# Patient Record
(demographics unavailable — no encounter records)

---

## 2025-05-04 NOTE — REASON FOR VISIT
[Follow-Up Visit] : a follow-up visit for [Rhabdomyosarcoma] : rhabdomyosarcoma [Patient] : patient [Mother] : mother [Medical Records] : medical records

## 2025-05-06 NOTE — HISTORY OF PRESENT ILLNESS
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   She was initially diagnosed with an alveolar ARUN of the right forearm in December 2016 at age 13.She also has Type I diabetes. In October 2016 Mahogany reported trauma to her arm during a martial arts class and she felt a lump on the palmar surface of her right forearm. She was seen an in urgent care and an ultrasound was suspicious for a mass. An MRI in November 2016 demonstrated a large soft tissue mass that was 3.6 X 2.4 X 7.9 cm multilobulated area of signal abnormality within the flexor compartment of the forearm with peripheral areas of internal enhancement which distorts the flexor digitorum profundus and superficialis musculature with soft tissue edema. 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 A chest CT was done immediately post operatively on December 1 which had no evidence of parenchymal lung lesions but revealed an enlarged right axillary node (2.5 cm) and right retropectoral lymph node (1.5 cm). A PET/CT was done on December 5th which had an SUV of 3.3 in the mass and the lymph node only had an SUV 2.1. There were no other nodes seen or evidence of bone involvement. The axillary node was positive by IR biopsy. 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 Completed initial chemotherapy with VAc/VI as per [id number] in August 2017 Due to risk of recurrence with Alveolar ARUN she was offered maintenance therapy extensive discussion of risk and benefits were discussed  family understands that there is no proven benefit at this time and there may be risks associated  the plan was to start on pazopanib for oral maintenance chemotherapy but she developed a non-healing ulcer on her heal that required significant wound care so the decision was made to switch to a modified chemotherapy regimen similar to [id number] but using oral cyclophosphamide, vinorelbine and oral sirolimus. She did not tolerate the oral sirolimus due to severe mucositis so was switched to avastin. IV therapy completed 8/26/19, po cyclophosphamide completed 8-31-19   Mahogany completed therapy AUGUST 2019 while in follow up an MRI in November 2020 with 0.4 cm nodule next to the incision primary site the lesion did not light up on pet scan however only 0.4 cm so was likely below level of detection She had the lesion which had doubled in size removed on December 24 and was diagnostic for relapsed ARUN Repeat extent of disease in January demonstrated no metastatic disease [place] node biopsy by Dr Pond was negative for metastatic disease PORT PLACED 1-22-21 LIPOSOMAL DOXO started on 1-22-21 3/5/21- ED visit for IV pain medicine and IV hydration due to mucositis and pain and palmar plantar erythrodysaesthesia. dose 3 3/22 and dose 4 4/22/21 dose reduced 25%-tolerated well Radiation 5/10-6/14/21-no doxil during RT. plan to resume dose 5 doxil 6/21/21. dose 6 of doxil back up to 100% dosing on 7-19-21 dose 7 of doxil on 8-16-21 dose 8 doxil on 9-16-21 dose 9 doxil on 10/11/21 dose 10 doxil on 11/22/21 (delayed due to poor wound healing) dose 11 doxil on 12-20-21.   Completed vaccine trial in [place] 7/7/22. [de-identified] : Relapsed ARUN January 2021 s/p 11 cycle liposomal doxo completed January 2022. 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

## 2025-05-06 NOTE — CONSULT LETTER
[Dear  ___] : Dear  [unfilled], [Courtesy Letter:] : I had the pleasure of seeing your patient, [unfilled], in my office today. [Please see my note below.] : Please see my note below. [Consult Closing:] : Thank you very much for allowing me to participate in the care of this patient.  If you have any questions, please do not hesitate to contact me. [Sincerely,] : Sincerely, [FreeTextEntry2] : Aisha Selby M.D.\par  [address] [facility]\[place]  [address]\par  Tel. #: [phone]\par  Fax #: [phone] [FreeTextEntry3] : Joelle Mcclellan MD \par  Head, Pediatric Oncology Solid Tumor Program\par  [facility] \danial   of Pediatrics\danial  [facility] at [facility]\danial [email or web address]  \par  \par

## 2025-05-06 NOTE — PHYSICAL EXAM
[Thin] : thin [Mediport] : Mediport [Gait normal] : gait normal [Normal] : affect appropriate [100: Normal, no complaints, no evidence of disease.] : 100: Normal, no complaints, no evidence of disease. [de-identified] : left turbinate larger than right [Mucositis] : no mucositis [Thrush] : no thrush [de-identified] : well appearing [de-identified] : clear bilaterally [de-identified] : s/p tendon release with increasedmotion no nodularity in the original scar [de-identified] : right forearm with subcutaneous erythema under the skin at RT site. No palpable mass. improved range of motion of fingers

## 2025-05-06 NOTE — CONSULT LETTER
[Dear  ___] : Dear  [unfilled], [Courtesy Letter:] : I had the pleasure of seeing your patient, [unfilled], in my office today. [Please see my note below.] : Please see my note below. [Consult Closing:] : Thank you very much for allowing me to participate in the care of this patient.  If you have any questions, please do not hesitate to contact me. [Sincerely,] : Sincerely, [FreeTextEntry2] : Aisha Selby M.D.\par  [address] [facility]\[place]  [address]\par  Tel. #: [phone]\par  Fax #: [phone] [FreeTextEntry3] : Joelle Mcclellan MD \par  Head, Pediatric Oncology Solid Tumor Program\par  [facility] \danial   of Pediatrics\danial  [facility] at [facility]\danial [email or web address]  \par  \par

## 2025-05-06 NOTE — REVIEW OF SYSTEMS
[Paresthesia] : tingling [Neuropathy] : neuropathy [Negative] : Allergic/Immunologic [Immunizations are up to date by report] : Immunizations are up to date by report [Mouth Ulcers] : no mouth ulcers [Palpitations] : no palpitations [FreeTextEntry8] : gluten intolerant following GI  [de-identified] : right hand with limited extension, [de-identified] : Type 1 diabetes [de-identified] : .

## 2025-05-06 NOTE — HISTORY OF PRESENT ILLNESS
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   She was initially diagnosed with an alveolar ARUN of the right forearm in December 2016 at age 13.She also has Type I diabetes. In October 2016 Mahogany reported trauma to her arm during a martial arts class and she felt a lump on the palmar surface of her right forearm. She was seen an in urgent care and an ultrasound was suspicious for a mass. An MRI in November 2016 demonstrated a large soft tissue mass that was 3.6 X 2.4 X 7.9 cm multilobulated area of signal abnormality within the flexor compartment of the forearm with peripheral areas of internal enhancement which distorts the flexor digitorum profundus and superficialis musculature with soft tissue edema. 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 A chest CT was done immediately post operatively on December 1 which had no evidence of parenchymal lung lesions but revealed an enlarged right axillary node (2.5 cm) and right retropectoral lymph node (1.5 cm). A PET/CT was done on December 5th which had an SUV of 3.3 in the mass and the lymph node only had an SUV 2.1. There were no other nodes seen or evidence of bone involvement. The axillary node was positive by IR biopsy. 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 Completed initial chemotherapy with VAc/VI as per [id number] in August 2017 Due to risk of recurrence with Alveolar ARUN she was offered maintenance therapy extensive discussion of risk and benefits were discussed  family understands that there is no proven benefit at this time and there may be risks associated  the plan was to start on pazopanib for oral maintenance chemotherapy but she developed a non-healing ulcer on her heal that required significant wound care so the decision was made to switch to a modified chemotherapy regimen similar to [id number] but using oral cyclophosphamide, vinorelbine and oral sirolimus. She did not tolerate the oral sirolimus due to severe mucositis so was switched to avastin. IV therapy completed 8/26/19, po cyclophosphamide completed 8-31-19   Mahogany completed therapy AUGUST 2019 while in follow up an MRI in November 2020 with 0.4 cm nodule next to the incision primary site the lesion did not light up on pet scan however only 0.4 cm so was likely below level of detection She had the lesion which had doubled in size removed on December 24 and was diagnostic for relapsed ARUN Repeat extent of disease in January demonstrated no metastatic disease [place] node biopsy by Dr Pond was negative for metastatic disease PORT PLACED 1-22-21 LIPOSOMAL DOXO started on 1-22-21 3/5/21- ED visit for IV pain medicine and IV hydration due to mucositis and pain and palmar plantar erythrodysaesthesia. dose 3 3/22 and dose 4 4/22/21 dose reduced 25%-tolerated well Radiation 5/10-6/14/21-no doxil during RT. plan to resume dose 5 doxil 6/21/21. dose 6 of doxil back up to 100% dosing on 7-19-21 dose 7 of doxil on 8-16-21 dose 8 doxil on 9-16-21 dose 9 doxil on 10/11/21 dose 10 doxil on 11/22/21 (delayed due to poor wound healing) dose 11 doxil on 12-20-21.   Completed vaccine trial in [place] 7/7/22. [de-identified] : Relapsed ARUN January 2021 s/p 11 cycle liposomal doxo completed January 2022. 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

## 2025-05-06 NOTE — PHYSICAL EXAM
[Thin] : thin [Mediport] : Mediport [Gait normal] : gait normal [Normal] : affect appropriate [100: Normal, no complaints, no evidence of disease.] : 100: Normal, no complaints, no evidence of disease. [de-identified] : left turbinate larger than right [Mucositis] : no mucositis [Thrush] : no thrush [de-identified] : well appearing [de-identified] : clear bilaterally [de-identified] : s/p tendon release with increasedmotion no nodularity in the original scar [de-identified] : right forearm with subcutaneous erythema under the skin at RT site. No palpable mass. improved range of motion of fingers

## 2025-05-06 NOTE — REVIEW OF SYSTEMS
[Paresthesia] : tingling [Neuropathy] : neuropathy [Negative] : Allergic/Immunologic [Immunizations are up to date by report] : Immunizations are up to date by report [Mouth Ulcers] : no mouth ulcers [Palpitations] : no palpitations [FreeTextEntry8] : gluten intolerant following GI  [de-identified] : right hand with limited extension, [de-identified] : Type 1 diabetes [de-identified] : .

## 2025-05-06 NOTE — REVIEW OF SYSTEMS
[Paresthesia] : tingling [Neuropathy] : neuropathy [Negative] : Allergic/Immunologic [Immunizations are up to date by report] : Immunizations are up to date by report [Mouth Ulcers] : no mouth ulcers [Palpitations] : no palpitations [FreeTextEntry8] : gluten intolerant following GI  [de-identified] : right hand with limited extension, [de-identified] : Type 1 diabetes [de-identified] : .

## 2025-05-06 NOTE — CONSULT LETTER
[Dear  ___] : Dear  [unfilled], [Courtesy Letter:] : I had the pleasure of seeing your patient, [unfilled], in my office today. [Please see my note below.] : Please see my note below. [Consult Closing:] : Thank you very much for allowing me to participate in the care of this patient.  If you have any questions, please do not hesitate to contact me. [Sincerely,] : Sincerely, [FreeTextEntry2] : Aisha Selby M.D.\par  [address] [facility]\[place]  [address]\par  Tel. #: [phone]\par  Fax #: [phone] [FreeTextEntry3] : Joelle Mcclellan MD \par  Head, Pediatric Oncology Solid Tumor Program\par  [facility] \danial   of Pediatrics\danial  [facility] at [facility]\danial [email or web address]  \par  \par

## 2025-05-06 NOTE — HISTORY OF PRESENT ILLNESS
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   She was initially diagnosed with an alveolar ARUN of the right forearm in December 2016 at age 13.She also has Type I diabetes. In October 2016 Mahogany reported trauma to her arm during a martial arts class and she felt a lump on the palmar surface of her right forearm. She was seen an in urgent care and an ultrasound was suspicious for a mass. An MRI in November 2016 demonstrated a large soft tissue mass that was 3.6 X 2.4 X 7.9 cm multilobulated area of signal abnormality within the flexor compartment of the forearm with peripheral areas of internal enhancement which distorts the flexor digitorum profundus and superficialis musculature with soft tissue edema. 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 A chest CT was done immediately post operatively on December 1 which had no evidence of parenchymal lung lesions but revealed an enlarged right axillary node (2.5 cm) and right retropectoral lymph node (1.5 cm). A PET/CT was done on December 5th which had an SUV of 3.3 in the mass and the lymph node only had an SUV 2.1. There were no other nodes seen or evidence of bone involvement. The axillary node was positive by IR biopsy. 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 Completed initial chemotherapy with VAc/VI as per [id number] in August 2017 Due to risk of recurrence with Alveolar ARUN she was offered maintenance therapy extensive discussion of risk and benefits were discussed  family understands that there is no proven benefit at this time and there may be risks associated  the plan was to start on pazopanib for oral maintenance chemotherapy but she developed a non-healing ulcer on her heal that required significant wound care so the decision was made to switch to a modified chemotherapy regimen similar to [id number] but using oral cyclophosphamide, vinorelbine and oral sirolimus. She did not tolerate the oral sirolimus due to severe mucositis so was switched to avastin. IV therapy completed 8/26/19, po cyclophosphamide completed 8-31-19   Mahogany completed therapy AUGUST 2019 while in follow up an MRI in November 2020 with 0.4 cm nodule next to the incision primary site the lesion did not light up on pet scan however only 0.4 cm so was likely below level of detection She had the lesion which had doubled in size removed on December 24 and was diagnostic for relapsed ARUN Repeat extent of disease in January demonstrated no metastatic disease [place] node biopsy by Dr Pond was negative for metastatic disease PORT PLACED 1-22-21 LIPOSOMAL DOXO started on 1-22-21 3/5/21- ED visit for IV pain medicine and IV hydration due to mucositis and pain and palmar plantar erythrodysaesthesia. dose 3 3/22 and dose 4 4/22/21 dose reduced 25%-tolerated well Radiation 5/10-6/14/21-no doxil during RT. plan to resume dose 5 doxil 6/21/21. dose 6 of doxil back up to 100% dosing on 7-19-21 dose 7 of doxil on 8-16-21 dose 8 doxil on 9-16-21 dose 9 doxil on 10/11/21 dose 10 doxil on 11/22/21 (delayed due to poor wound healing) dose 11 doxil on 12-20-21.   Completed vaccine trial in [place] 7/7/22. [de-identified] : Relapsed ARUN January 2021 s/p 11 cycle liposomal doxo completed January 2022. 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

## 2025-05-06 NOTE — PHYSICAL EXAM
[Thin] : thin [Mediport] : Mediport [Gait normal] : gait normal [Normal] : affect appropriate [100: Normal, no complaints, no evidence of disease.] : 100: Normal, no complaints, no evidence of disease. [de-identified] : left turbinate larger than right [Mucositis] : no mucositis [Thrush] : no thrush [de-identified] : well appearing [de-identified] : clear bilaterally [de-identified] : s/p tendon release with increasedmotion no nodularity in the original scar [de-identified] : right forearm with subcutaneous erythema under the skin at RT site. No palpable mass. improved range of motion of fingers

## 2025-07-23 NOTE — CONSULT LETTER
[Dear  ___:] : Dear Dr. [unfilled]: [Today's Date] : [unfilled] [Name] : Name: [unfilled] [] : : ~~ [Today's Date:] : [unfilled] [Consult] : I had the pleasure of evaluating your patient, [unfilled]. My full evaluation follows. [Consult - Single Provider] : Thank you very much for allowing me to participate in the care of this patient. If you have any questions, please do not hesitate to contact me. [Sincerely,] : Sincerely, [FreeTextEntry4] :  Joelle Mcclellan MD [FreeTextEntry5] : [phone] [FreeTextEntry6] : [place], [place] [de-identified] : Ina Grimes MD Attending Pediatric Cardiologist  [facility] [phone] [email or web address]

## 2025-07-23 NOTE — PHYSICAL EXAM
[General Appearance - Alert] : alert [General Appearance - In No Acute Distress] : in no acute distress [General Appearance - Well Nourished] : well nourished [General Appearance - Well Developed] : well developed [General Appearance - Well-Appearing] : well appearing [Appearance Of Head] : the head was normocephalic [Facies] : there were no dysmorphic facial features [Sclera] : the conjunctiva were normal [Outer Ear] : the ears and nose were normal in appearance [Examination Of The Oral Cavity] : mucous membranes were moist and pink [Auscultation Breath Sounds / Voice Sounds] : breath sounds clear to auscultation bilaterally [Normal Chest Appearance] : the chest was normal in appearance [Apical Impulse] : quiet precordium with normal apical impulse [Heart Rate And Rhythm] : normal heart rate and rhythm [Heart Sounds] : normal S1 and S2 [Heart Sounds Gallop] : no gallops [Heart Sounds Pericardial Friction Rub] : no pericardial rub [Edema] : no edema [Arterial Pulses] : normal upper and lower extremity pulses with no pulse delay [Heart Sounds Click] : no clicks [Capillary Refill Test] : normal capillary refill [Systolic] : systolic [I] : a grade 1/6  [LMSB] : LMSB  [LUSB] : LUSB [Crescendo-Decrescendo] : crescendo-decrescendo [Abdomen Soft] : soft [Nondistended] : nondistended [Abdomen Tenderness] : non-tender [Nail Clubbing] : no clubbing  or cyanosis of the fingers [Motor Tone] : normal muscle strength and tone [] : no rash [Skin Lesions] : no lesions [Skin Turgor] : normal turgor [Demonstrated Behavior - Infant Nonreactive To Parents] : interactive [Mood] : mood and affect were appropriate for age [Demonstrated Behavior] : normal behavior

## 2025-07-23 NOTE — REASON FOR VISIT
[Follow-Up] : a follow-up visit for [Mother] : mother [Patient] : patient [FreeTextEntry3] : Hx of palpitations, risk for Cardiomyopathy

## 2025-07-23 NOTE — HISTORY OF PRESENT ILLNESS
[FreeTextEntry1] : I had the pleasure of seeing MADIHA TRINH in the pediatric cardiology clinic at [facility] on Jul 23, 2025. MADIHA is a 22 year-old girl   I had the pleasure of seeing MADIHA TRINH in the pediatric cardiology clinic at [facility] on September 19, 2024; she was last seen on February 8, 2023.  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  The LPA stenosis was diagnosed at the same time as her initial diagnosis of alveolar ARUN at age 13.  Madiha also has a history of HTN, treated with LIsinopril and amlodipine in the past. Her initial treatments, starting in December 2016, included chemothearpy and local proton therapy.  Her chemotherapy, [id number] protocol included vincristine, dactinomycin, cyclophosphamide.  She was subsequently on maintenace therapy of cyclophosphamide, vinorelbine and avastin completed August 2019.  In late 2020 she was found to have relapse of the ARUN, with treatment in 2021 with liposomal doxorubicin and radiation therapy.  This past year, she completed a vaccine trial in [place].    Today Madiha has no concerning symptoms.  She has had no recent palpitations or other abnormal heart beat symptoms.  She has good energy levels.  No symptoms of chest pain, shortness of breath, dizziness, syncope or edema.   Unfortunately, Madiha's father passed away unexpectedly from an apparent heart attack in February 2021.

## 2025-07-23 NOTE — REVIEW OF SYSTEMS
[Feeling Poorly] : not feeling poorly (malaise) [Fever] : no fever [Wgt Loss (___ Lbs)] : no recent weight loss [Pallor] : not pale [Eye Discharge] : no eye discharge [Redness] : no redness [Change in Vision] : no change in vision [Nasal Stuffiness] : no nasal congestion [Sore Throat] : no sore throat [Earache] : no earache [Loss Of Hearing] : no hearing loss [Cyanosis] : no cyanosis [Edema] : no edema [Diaphoresis] : not diaphoretic [Chest Pain] : no chest pain or discomfort [Exercise Intolerance] : no persistence of exercise intolerance [Palpitations] : no palpitations [Orthopnea] : no orthopnea [Fast HR] : no tachycardia [Tachypnea] : not tachypneic [Wheezing] : no wheezing [Cough] : no cough [Shortness Of Breath] : not expressed as feeling short of breath [Vomiting] : no vomiting [Diarrhea] : no diarrhea [Abdominal Pain] : no abdominal pain [Decrease In Appetite] : appetite not decreased [Fainting (Syncope)] : no fainting [Seizure] : no seizures [Headache] : headache [Dizziness] : no dizziness [Limping] : no limping [Joint Pains] : no arthralgias [Joint Swelling] : no joint swelling [Rash] : no rash [Wound problems] : no wound problems [Easy Bruising] : no tendency for easy bruising [Swollen Glands] : no lymphadenopathy [Easy Bleeding] : no ~M tendency for easy bleeding [Nosebleeds] : no epistaxis [Sleep Disturbances] : ~T no sleep disturbances [Hyperactive] : no hyperactive behavior [Depression] : no depression [Anxiety] : no anxiety [Failure To Thrive] : no failure to thrive [Short Stature] : short stature was not noted [Jitteriness] : no jitteriness [Heat/Cold Intolerance] : no temperature intolerance [Dec Urine Output] : no oliguria

## 2025-07-23 NOTE — DISCUSSION/SUMMARY
[Needs SBE Prophylaxis] : [unfilled] does not need bacterial endocarditis prophylaxis [PE + No Restrictions] : [unfilled] may participate in the entire physical education program without restriction, including all varsity competitive sports. [FreeTextEntry1] : Mahogany is a 22-year-old girl with type 1 diabetes, alvelolar rhabdomyocarcoma of the right forearm s/p chemotherapy and radiation, and relapse of the ARUN now s/p treatment with doxorubicin and radiation.    Today she is asymptomatic.  Previous symptoms of palpitations were worked up with a Holter monitor, which was normal.    normal LV systolic and diastolic function. She is also followed for mild LPA stenosis - when this was first diagnosed in 2016 the LPA was mildly hypoplastic with the RPA being almost twice the size of the LPA.  Over time, the LPA has grown and is now normal in size.  There is very minimal flow acceleration in the LPA with no significant gradient.  Of note, a lung perfusion scan in December 2018 showed 31% to the L, 69% to the R (normal left lung perfusion is ~ 40%).  I do not expect this to cause her any symptoms or complications. I discussed the transition to the [facility] team.  Recommendations: 1. F/U in 1 year with [facility] team - information for Dr. Juan Miguel Vivas and Dr. Aisha Ocampo were given.

## 2025-07-23 NOTE — CARDIOLOGY SUMMARY
[Today's Date] : [unfilled] [FreeTextEntry1] : Normal sinus rhythm with a rate of 67, QRS axis 71, normal intervals, QTc 443.  No evidence of atrial or ventricular enlargement.  Normal T waves and ST segments.  No delta waves. [de-identified] : 12/17/2018 [de-identified] : Lung perfusion scan:\par  Left lung 31% (previously 27%), Right lung 69% (previously 73%)